# Patient Record
Sex: MALE | Race: WHITE | NOT HISPANIC OR LATINO | URBAN - METROPOLITAN AREA
[De-identification: names, ages, dates, MRNs, and addresses within clinical notes are randomized per-mention and may not be internally consistent; named-entity substitution may affect disease eponyms.]

---

## 2020-07-07 ENCOUNTER — IMPORTED ENCOUNTER (OUTPATIENT)
Dept: URBAN - METROPOLITAN AREA CLINIC 50 | Facility: CLINIC | Age: 70
End: 2020-07-07

## 2020-07-08 ENCOUNTER — IMPORTED ENCOUNTER (OUTPATIENT)
Dept: URBAN - METROPOLITAN AREA CLINIC 50 | Facility: CLINIC | Age: 70
End: 2020-07-08

## 2020-07-27 ENCOUNTER — IMPORTED ENCOUNTER (OUTPATIENT)
Dept: URBAN - METROPOLITAN AREA CLINIC 50 | Facility: CLINIC | Age: 70
End: 2020-07-27

## 2020-08-05 ENCOUNTER — IMPORTED ENCOUNTER (OUTPATIENT)
Dept: URBAN - METROPOLITAN AREA CLINIC 50 | Facility: CLINIC | Age: 70
End: 2020-08-05

## 2020-08-11 ENCOUNTER — IMPORTED ENCOUNTER (OUTPATIENT)
Dept: URBAN - METROPOLITAN AREA CLINIC 50 | Facility: CLINIC | Age: 70
End: 2020-08-11

## 2020-08-11 NOTE — PATIENT DISCUSSION
"""S/P IOL OD: Tecnis [id number] +20.5 @ 15Âº (Target: [place]) +Femto/Arcs +Omidria.  Continue post ""

## 2020-08-11 NOTE — PATIENT DISCUSSION
"""S/P IOL OD: Tecnis [id number] +20.5 @ 15Âº (Target: [place]) +Femto/Arcs +Omidria. Continue post operative instructions and drops per schedule.  """

## 2020-08-19 ENCOUNTER — IMPORTED ENCOUNTER (OUTPATIENT)
Dept: URBAN - METROPOLITAN AREA CLINIC 50 | Facility: CLINIC | Age: 70
End: 2020-08-19

## 2020-08-19 NOTE — PATIENT DISCUSSION
"""S/P IOL OD: Tecnis [id number] +20.5 @ 15Âº (Target: [place]) +Femto/Arcs +Omidria. Continue post operative instructions and drops per schedule.  """

## 2020-08-25 ENCOUNTER — IMPORTED ENCOUNTER (OUTPATIENT)
Dept: URBAN - METROPOLITAN AREA CLINIC 50 | Facility: CLINIC | Age: 70
End: 2020-08-25

## 2020-08-25 NOTE — PATIENT DISCUSSION
"""S/P IOL OS: Tecnis [id number] 20.0 @ 165Âº (Target: [place]) +Femto/Arcs +Omidria. Continue post operative instructions and drops per schedule.  """

## 2020-09-02 ENCOUNTER — IMPORTED ENCOUNTER (OUTPATIENT)
Dept: URBAN - METROPOLITAN AREA CLINIC 50 | Facility: CLINIC | Age: 70
End: 2020-09-02

## 2020-09-02 NOTE — PATIENT DISCUSSION
"""S/P IOL OS: Tecnis [id number] 20.0 @ 165Âº (Target: [place]) +Femto/Arcs +Omidria.  Continue post operative instructions and drops per schedule. "" ""Continue Pred-Moxi-Nepaf left eye twice a day

## 2020-09-30 ENCOUNTER — IMPORTED ENCOUNTER (OUTPATIENT)
Dept: URBAN - METROPOLITAN AREA CLINIC 50 | Facility: CLINIC | Age: 70
End: 2020-09-30

## 2020-09-30 NOTE — PATIENT DISCUSSION
"""S/P IOL OU: OD: Tecnis [id number] +20.5 @ 15Âº (Target: [place])Femto/ArcsOmidria.  OS: Tecnis ""

## 2020-11-09 NOTE — PATIENT DISCUSSION
11 9 20 PT INDICATED HE DID NOT HAVE RD - TO REMOVE FROM PROBLEM LIST - DR MARTELL CONFIRMED WITH PATIENT.

## 2021-01-06 ENCOUNTER — IMPORTED ENCOUNTER (OUTPATIENT)
Dept: URBAN - METROPOLITAN AREA CLINIC 50 | Facility: CLINIC | Age: 71
End: 2021-01-06

## 2021-04-17 ASSESSMENT — TONOMETRY
OS_IOP_MMHG: 14
OS_IOP_MMHG: 16
OD_IOP_MMHG: 10
OS_IOP_MMHG: 15
OD_IOP_MMHG: 10
OS_IOP_MMHG: 11
OS_IOP_MMHG: 21
OD_IOP_MMHG: 16
OD_IOP_MMHG: 10
OD_IOP_MMHG: 44
OD_IOP_MMHG: 13
OD_IOP_MMHG: 27
OD_IOP_MMHG: 12
OS_IOP_MMHG: 19
OD_IOP_MMHG: 17
OS_IOP_MMHG: 19
OD_IOP_MMHG: 15

## 2021-04-17 ASSESSMENT — VISUAL ACUITY
OD_BAT: >20/400
OD_SC: 20/70
OS_CC: 20/40
OD_BAT: >20/400
OS_CC: J1+@ 18 IN
OS_PH: 20/30
OD_OTHER: >20/400.
OS_BAT: >20/400
OD_SC: 20/60
OS_CC: J1+
OD_SC: 20/25+1
OS_OTHER: >20/400.
OD_OTHER: 20/25.
OS_SC: 20/200
OD_CC: 20/30-1
OD_CC: J1+@ 18 IN
OS_OTHER: 20/20.
OD_CC: J1+
OS_SC: 20/25+2
OS_CC: 20/70
OD_SC: 20/20-2
OS_OTHER: >20/400. >20/400.
OS_SC: 20/20-1
OD_CC: 20/30
OS_BAT: >20/400
OD_SC: 20/25
OS_CC: 20/40-
OD_PH: 20/40
OS_BAT: >20/400
OS_OTHER: >20/400. >20/400.
OD_SC: 20/20-1
OS_SC: 20/20-1
OD_OTHER: >20/400.

## 2021-05-10 NOTE — PATIENT DISCUSSION
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  IT HAS BEEN A WHILE.""."

## 2021-11-18 NOTE — PATIENT DISCUSSION
No RETINAL CONTRAINDICATION to cataract surgery - PT UNDERSTANDS PT AND ERM CAN INCREASE AFTER CAT SURGERY.

## 2021-11-19 ENCOUNTER — PREPPED CHART (OUTPATIENT)
Dept: URBAN - METROPOLITAN AREA CLINIC 52 | Facility: CLINIC | Age: 71
End: 2021-11-19

## 2021-11-22 ENCOUNTER — COMPREHENSIVE EXAM (OUTPATIENT)
Dept: URBAN - METROPOLITAN AREA CLINIC 52 | Facility: CLINIC | Age: 71
End: 2021-11-22

## 2021-11-22 DIAGNOSIS — H26.493: ICD-10-CM

## 2021-11-22 DIAGNOSIS — H43.813: ICD-10-CM

## 2021-11-22 DIAGNOSIS — E11.9: ICD-10-CM

## 2021-11-22 PROCEDURE — 92014 COMPRE OPH EXAM EST PT 1/>: CPT

## 2021-11-22 PROCEDURE — 92015 DETERMINE REFRACTIVE STATE: CPT

## 2021-11-22 PROCEDURE — 92134 CPTRZ OPH DX IMG PST SGM RTA: CPT

## 2021-11-22 ASSESSMENT — TONOMETRY
OD_IOP_MMHG: 14
OS_IOP_MMHG: 13

## 2021-11-22 ASSESSMENT — VISUAL ACUITY
OS_GLARE: 20/20
OD_SC: 20/25
OD_GLARE: 20/25
OS_SC: 20/20-2
OU_SC: 20/20-2
OS_GLARE: 20/25
OD_GLARE: 20/20

## 2022-10-19 NOTE — PATIENT DISCUSSION
recommend monitoring, however patient concerned and would like to see Dr. Adriel Dotson again for evaluation.

## 2022-10-19 NOTE — PATIENT DISCUSSION
contact Dr. Angel Solares to review my OCT from today to determine if he would want to see any sooner than his recall July 2023.  Spoke w Dr. Angel Solares who reviewed OCT and he stated no change from his last visit and to continue monitoring and refer if any major vision change. He did not feel and intervention would be indicated/worthwhile.

## 2022-10-19 NOTE — PATIENT DISCUSSION
OCT shows progression however comparing to 2020 as I have no recent scans since Dr. Clemente Elise has been monitoring.

## 2024-09-25 ENCOUNTER — COMPREHENSIVE EXAM (OUTPATIENT)
Dept: URBAN - METROPOLITAN AREA CLINIC 49 | Facility: LOCATION | Age: 74
End: 2024-09-25

## 2024-09-25 DIAGNOSIS — H40.052: ICD-10-CM

## 2024-09-25 DIAGNOSIS — H26.493: ICD-10-CM

## 2024-09-25 DIAGNOSIS — H04.123: ICD-10-CM

## 2024-09-25 DIAGNOSIS — H43.813: ICD-10-CM

## 2024-09-25 DIAGNOSIS — H52.4: ICD-10-CM

## 2024-09-25 DIAGNOSIS — E11.9: ICD-10-CM

## 2024-09-25 PROCEDURE — 99214 OFFICE O/P EST MOD 30 MIN: CPT
